# Patient Record
Sex: MALE | Race: WHITE | URBAN - METROPOLITAN AREA
[De-identification: names, ages, dates, MRNs, and addresses within clinical notes are randomized per-mention and may not be internally consistent; named-entity substitution may affect disease eponyms.]

---

## 2022-03-08 ENCOUNTER — OFFICE VISIT (OUTPATIENT)
Dept: URBAN - METROPOLITAN AREA CLINIC 24 | Facility: CLINIC | Age: 77
End: 2022-03-08
Payer: COMMERCIAL

## 2022-03-08 DIAGNOSIS — H35.3132 NONEXUDATIVE MACULAR DEGENERATION, INTERMEDIATE DRY STAGE, BILATERAL: ICD-10-CM

## 2022-03-08 DIAGNOSIS — H52.223 REGULAR ASTIGMATISM, BILATERAL: ICD-10-CM

## 2022-03-08 DIAGNOSIS — H25.813 COMBINED FORMS OF AGE-RELATED CATARACT, BILATERAL: Primary | ICD-10-CM

## 2022-03-08 DIAGNOSIS — H40.053 OCULAR HYPERTENSION, BILATERAL: ICD-10-CM

## 2022-03-08 PROCEDURE — 92134 CPTRZ OPH DX IMG PST SGM RTA: CPT | Performed by: OPTOMETRIST

## 2022-03-08 PROCEDURE — 92133 CPTRZD OPH DX IMG PST SGM ON: CPT | Performed by: OPTOMETRIST

## 2022-03-08 PROCEDURE — 99204 OFFICE O/P NEW MOD 45 MIN: CPT | Performed by: OPTOMETRIST

## 2022-03-08 ASSESSMENT — VISUAL ACUITY
OS: 20/20
OD: 20/20

## 2022-03-08 ASSESSMENT — INTRAOCULAR PRESSURE
OS: 23
OD: 25

## 2022-03-08 ASSESSMENT — KERATOMETRY
OD: 42.77
OS: 43.34

## 2022-03-08 NOTE — IMPRESSION/PLAN
Impression: Nonexudative macular degeneration, intermediate dry stage, bilateral: H35.2627. Plan: Dry Age Related Macular Degeneration- Patient educated regarding finding. Recommend patient START to take an formula multiple vitamin daily. Observation is all that is indicated at this time.

## 2022-03-08 NOTE — IMPRESSION/PLAN
Impression: Ocular hypertension, bilateral: H40.053. Plan: Rim intact. 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 Stressed importance of continued / annual observation.

## 2022-03-08 NOTE — IMPRESSION/PLAN
Impression: Regular astigmatism, bilateral: H52.223. Presbyopia OU Plan: Updated SRx released. Limitations 2/2 cataracts and AMD discussed.

## 2022-03-08 NOTE — IMPRESSION/PLAN
Impression: Combined forms of age-related cataract, bilateral: H25.813. Plan: Cataract accounts for the patient's complaints. Discussed options, surgery or spectacle change, explained surgery risks, benefits. Patient defers surgery and elects to change glasses firsts. Pt advised cataracts are limiting. Will re-evaluate cataracts(s) on return visit.

## 2025-04-18 ENCOUNTER — OFFICE VISIT (OUTPATIENT)
Dept: URBAN - METROPOLITAN AREA CLINIC 24 | Facility: CLINIC | Age: 80
End: 2025-04-18
Payer: COMMERCIAL

## 2025-04-18 DIAGNOSIS — H35.3132 NONEXUDATIVE MACULAR DEGENERATION, INTERMEDIATE DRY STAGE, BILATERAL: ICD-10-CM

## 2025-04-18 DIAGNOSIS — H40.1134 PRIMARY OPEN-ANGLE GLAUCOMA, INDETERMINATE, BILATERAL: Primary | ICD-10-CM

## 2025-04-18 DIAGNOSIS — H25.813 COMBINED FORMS OF AGE-RELATED CATARACT, BILATERAL: ICD-10-CM

## 2025-04-18 PROCEDURE — 92133 CPTRZD OPH DX IMG PST SGM ON: CPT | Performed by: OPTOMETRIST

## 2025-04-18 PROCEDURE — 76514 ECHO EXAM OF EYE THICKNESS: CPT | Performed by: OPTOMETRIST

## 2025-04-18 PROCEDURE — 99204 OFFICE O/P NEW MOD 45 MIN: CPT | Performed by: OPTOMETRIST

## 2025-04-18 RX ORDER — LATANOPROST 50 UG/ML
0.005 % SOLUTION OPHTHALMIC
Qty: 7.5 | Refills: 3 | Status: ACTIVE
Start: 2025-04-18

## 2025-04-18 ASSESSMENT — VISUAL ACUITY
OS: 20/50
OD: 20/40

## 2025-04-18 ASSESSMENT — INTRAOCULAR PRESSURE
OD: 29
OS: 34

## 2025-04-23 ENCOUNTER — TECH ONLY (OUTPATIENT)
Facility: LOCATION | Age: 80
End: 2025-04-23
Payer: COMMERCIAL

## 2025-04-23 ENCOUNTER — ADULT PHYSICAL (OUTPATIENT)
Facility: LOCATION | Age: 80
End: 2025-04-23
Payer: COMMERCIAL

## 2025-04-23 DIAGNOSIS — H40.1134 PRIMARY OPEN-ANGLE GLAUCOMA, BILATERAL, INDETERMINATE STAGE: ICD-10-CM

## 2025-04-23 DIAGNOSIS — H25.813 COMBINED FORMS OF AGE-RELATED CATARACT, BILATERAL: ICD-10-CM

## 2025-04-23 DIAGNOSIS — Z01.818 ENCOUNTER FOR OTHER PREPROCEDURAL EXAMINATION: Primary | ICD-10-CM

## 2025-04-23 PROCEDURE — 99203 OFFICE O/P NEW LOW 30 MIN: CPT

## 2025-04-23 RX ORDER — LOSARTAN POTASSIUM 50 MG/1
50 MG TABLET, FILM COATED ORAL
Qty: 0 | Refills: 0 | Status: ACTIVE
Start: 2025-04-23

## 2025-04-23 RX ORDER — METOPROLOL SUCCINATE 25 MG/1
25 MG TABLET, FILM COATED, EXTENDED RELEASE ORAL
Qty: 0 | Refills: 0 | Status: ACTIVE
Start: 2025-04-23

## 2025-04-23 RX ORDER — ZINC SULFATE 50(220)MG
CAPSULE ORAL
Qty: 0 | Refills: 0 | Status: ACTIVE
Start: 2025-04-23

## 2025-04-23 RX ORDER — AMPICILLIN TRIHYDRATE 250 MG
600 MG CAPSULE ORAL
Qty: 0 | Refills: 0 | Status: ACTIVE
Start: 2025-04-23

## 2025-04-23 RX ORDER — MULTIVITAMIN
TABLET ORAL
Qty: 0 | Refills: 0 | Status: ACTIVE
Start: 2025-04-23

## 2025-05-20 ENCOUNTER — OFFICE VISIT (OUTPATIENT)
Dept: URBAN - METROPOLITAN AREA CLINIC 24 | Facility: CLINIC | Age: 80
End: 2025-05-20
Payer: COMMERCIAL

## 2025-05-20 DIAGNOSIS — H25.813 COMBINED FORMS OF AGE-RELATED CATARACT, BILATERAL: ICD-10-CM

## 2025-05-20 DIAGNOSIS — H40.1134 PRIMARY OPEN-ANGLE GLAUCOMA, BILATERAL, INDETERMINATE STAGE: Primary | ICD-10-CM

## 2025-05-20 DIAGNOSIS — H35.3132 NONEXUDATIVE MACULAR DEGENERATION, INTERMEDIATE DRY STAGE, BILATERAL: ICD-10-CM

## 2025-05-20 PROCEDURE — 99204 OFFICE O/P NEW MOD 45 MIN: CPT | Performed by: OPHTHALMOLOGY

## 2025-05-20 PROCEDURE — 76514 ECHO EXAM OF EYE THICKNESS: CPT | Performed by: OPHTHALMOLOGY

## 2025-05-20 ASSESSMENT — INTRAOCULAR PRESSURE
OS: 20
OD: 20

## 2025-08-21 ENCOUNTER — SURGERY (OUTPATIENT)
Facility: LOCATION | Age: 80
End: 2025-08-21
Payer: MEDICARE

## 2025-08-21 PROCEDURE — 0660T IMPLT ANT SGM IO NBIO RX SYS: CPT | Performed by: OPHTHALMOLOGY

## 2025-08-22 ENCOUNTER — POST-OPERATIVE VISIT (OUTPATIENT)
Dept: URBAN - METROPOLITAN AREA CLINIC 24 | Facility: CLINIC | Age: 80
End: 2025-08-22
Payer: MEDICARE

## 2025-08-22 DIAGNOSIS — Z48.810 ENCOUNTER FOR SURGICAL AFTERCARE FOLLOWING SURGERY ON A SENSE ORGAN: Primary | ICD-10-CM

## 2025-08-22 PROCEDURE — 99024 POSTOP FOLLOW-UP VISIT: CPT | Performed by: OPTOMETRIST

## 2025-08-22 ASSESSMENT — INTRAOCULAR PRESSURE: OS: 22

## 2025-08-28 ENCOUNTER — POST-OPERATIVE VISIT (OUTPATIENT)
Dept: URBAN - METROPOLITAN AREA CLINIC 24 | Facility: CLINIC | Age: 80
End: 2025-08-28
Payer: MEDICARE

## 2025-08-28 DIAGNOSIS — Z48.810 ENCOUNTER FOR SURGICAL AFTERCARE FOLLOWING SURGERY ON A SENSE ORGAN: Primary | ICD-10-CM

## 2025-08-28 PROCEDURE — 99024 POSTOP FOLLOW-UP VISIT: CPT | Performed by: OPTOMETRIST

## 2025-08-28 ASSESSMENT — INTRAOCULAR PRESSURE
OD: 23
OS: 19